# Patient Record
Sex: MALE | Race: WHITE | NOT HISPANIC OR LATINO | Employment: FULL TIME | URBAN - METROPOLITAN AREA
[De-identification: names, ages, dates, MRNs, and addresses within clinical notes are randomized per-mention and may not be internally consistent; named-entity substitution may affect disease eponyms.]

---

## 2018-01-15 ENCOUNTER — OFFICE VISIT (OUTPATIENT)
Dept: FAMILY MEDICINE | Facility: CLINIC | Age: 58
End: 2018-01-15
Payer: COMMERCIAL

## 2018-01-15 VITALS
HEIGHT: 70 IN | DIASTOLIC BLOOD PRESSURE: 81 MMHG | WEIGHT: 171 LBS | BODY MASS INDEX: 24.48 KG/M2 | HEART RATE: 80 BPM | SYSTOLIC BLOOD PRESSURE: 140 MMHG

## 2018-01-15 DIAGNOSIS — Z13.29 SCREENING FOR THYROID DISORDER: ICD-10-CM

## 2018-01-15 DIAGNOSIS — R06.83 SNORING: ICD-10-CM

## 2018-01-15 DIAGNOSIS — R35.0 URINARY FREQUENCY: Primary | ICD-10-CM

## 2018-01-15 DIAGNOSIS — Z13.220 LIPID SCREENING: ICD-10-CM

## 2018-01-15 DIAGNOSIS — Z13.1 DIABETES MELLITUS SCREENING: ICD-10-CM

## 2018-01-15 DIAGNOSIS — R03.0 ELEVATED BLOOD PRESSURE READING: ICD-10-CM

## 2018-01-15 DIAGNOSIS — N52.9 PRIMARY ERECTILE DYSFUNCTION: ICD-10-CM

## 2018-01-15 PROCEDURE — 99203 OFFICE O/P NEW LOW 30 MIN: CPT | Mod: ,,, | Performed by: INTERNAL MEDICINE

## 2018-01-15 RX ORDER — TADALAFIL 10 MG/1
10 TABLET ORAL DAILY PRN
Qty: 8 TABLET | Refills: 5 | Status: SHIPPED | OUTPATIENT
Start: 2018-01-15 | End: 2019-01-15

## 2018-01-15 NOTE — PROGRESS NOTES
Subjective:       Patient ID: Gaudencio Garsia is a 57 y.o. male.    Chief Complaint: Urinary Frequency; Establish Care; Erectile Dysfunction; and Snoring    Mr. Gaudencio Garsia is a 57-year-old  male who comes for new patient evaluation. Thus far he has not been diagnosed with any major medical issues like hypertension, dyslipidemia, sugar diabetes,COPD, arthritis or asthma.    He did experience some symptoms of urgency and frequency recently when he had a flu symptom and perhaps had taken some over-the-counter decongestant medications. After being relieved from FLU symptoms, his symptoms of urinary frequency and urgency are getting better.    He did have a colonoscopy approximately 9 years ago and he was told that he should come back in 10 years.    Father did have a bypass surgery and had a heart monitor. Mother is hypertensive and diabetic. Patient's sister is doing okay.    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          Urinary Frequency    This is a new problem. The current episode started in the past 7 days. The problem occurs intermittently. The problem has been rapidly improving. He is sexually active. There is no history of pyelonephritis. Associated symptoms include frequency (better now). Pertinent negatives include no chills, hematuria, hesitancy, weight loss, bubble bath use, constipation, rash or withholding. He has tried nothing for the symptoms. There is no history of catheterization, diabetes insipidus, diabetes mellitus, hypertension, kidney stones, recurrent UTIs, STD, urinary stasis or a urological procedure.   Erectile Dysfunction   This is a chronic problem. The current episode started more than 1 year ago. The problem has been waxing and waning since onset. The nature of his difficulty is maintaining erection. Non-physiologic factors contributing to erectile dysfunction are performance anxiety. He reports no anxiety or decreased libido. Irritative symptoms  include frequency (better now). Obstructive symptoms do not include an intermittent stream or a slower stream. Pertinent negatives include no chills, dysuria, hematuria or hesitancy. The symptoms are aggravated by stress (Wife on some meds which reduce her desire). Past treatments include nothing. There are no known risk factors.       Past Medical History:   Diagnosis Date    Allergy     Bee Pollens     Social History     Social History    Marital status:      Spouse name: N/A    Number of children: N/A    Years of education: N/A     Occupational History    Dispatches  [employer]     Social History Main Topics    Smoking status: Never Smoker    Smokeless tobacco: Never Used    Alcohol use 1.8 - 2.4 oz/week     3 - 4 Cans of beer per week      Comment: 6 over weekend occ hard liquor    Drug use: No    Sexual activity: Yes     Partners: Female     Other Topics Concern    Not on file     Social History Narrative    No narrative on file     Past Surgical History:   Procedure Laterality Date    COLONOSCOPY  2009    Dr julien    TONSILLECTOMY       Family History   Problem Relation Age of Onset    Diabetes Mother     Hearing loss Mother     Cancer Father     Hearing loss Father        Review of Systems   Constitutional: Negative for activity change, appetite change, chills, fatigue, unexpected weight change and weight loss.   HENT: Negative for congestion, sneezing and trouble swallowing.    Eyes: Negative for pain and visual disturbance.   Respiratory: Negative for cough, chest tightness and shortness of breath.    Cardiovascular: Negative for chest pain, palpitations and leg swelling.   Gastrointestinal: Negative for abdominal distention, abdominal pain, blood in stool, constipation and diarrhea.   Endocrine: Negative for cold intolerance, heat intolerance, polydipsia, polyphagia and polyuria.   Genitourinary: Positive for frequency (better now). Negative for decreased libido, dysuria, hematuria,  "hesitancy and scrotal swelling.   Musculoskeletal: Negative for arthralgias, back pain and gait problem.   Skin: Negative for pallor, rash and wound.   Allergic/Immunologic: Negative for environmental allergies, food allergies and immunocompromised state.   Neurological: Negative for dizziness, seizures, speech difficulty, light-headedness and numbness.   Hematological: Negative for adenopathy. Does not bruise/bleed easily.   Psychiatric/Behavioral: Negative for agitation, behavioral problems and confusion. The patient is not nervous/anxious.        Objective:       Vitals:    01/15/18 1458   BP: (!) 140/81   Pulse: 80   Weight: 77.6 kg (171 lb)   Height: 5' 10" (1.778 m)     Physical Exam   Constitutional: He is oriented to person, place, and time. He appears well-developed.   HENT:   Head: Normocephalic and atraumatic.   Nose: Nose normal.   Mouth/Throat: Oropharynx is clear and moist. No oropharyngeal exudate.   Eyes: Conjunctivae and EOM are normal.   Neck: Normal range of motion. Neck supple. No JVD present. No tracheal deviation present. No thyromegaly present.   Cardiovascular: Normal rate, regular rhythm and normal heart sounds.  Exam reveals no gallop and no friction rub.    No murmur heard.  Pulmonary/Chest: Effort normal and breath sounds normal. No respiratory distress. He has no wheezes. He has no rales.   Abdominal: Soft. Bowel sounds are normal. He exhibits no distension. There is no tenderness.   Musculoskeletal: Normal range of motion.   Neurological: He is alert and oriented to person, place, and time. He has normal reflexes.   Skin: Skin is warm and dry.   Psychiatric: He has a normal mood and affect.   Nursing note and vitals reviewed.      Assessment:       1. Urinary frequency    2. Primary erectile dysfunction    3. Snoring    4. Elevated blood pressure reading    5. Lipid screening    6. Diabetes mellitus screening    7. Screening for thyroid disorder         Plan:           Urinary " frequency    Primary erectile dysfunction  -     tadalafil (CIALIS) 10 MG tablet; Take 1 tablet (10 mg total) by mouth daily as needed for Erectile Dysfunction.  Dispense: 8 tablet; Refill: 5    Snoring    Elevated blood pressure reading    Lipid screening  -     Lipid panel; Future; Expected date: 01/15/2018    Diabetes mellitus screening  -     Glucose, fasting; Future; Expected date: 01/15/2018    Screening for thyroid disorder  -     TSH; Future; Expected date: 01/15/2018    Had a fair discussion about elevated BP. ED issues- pros and cons of treatment, expectations and cost of meds,( Cialis Vs Viagra)    Will screen for DM, Lipids and thyroid before any potential TASHI testing.    Fup in 3 months to review status and benefit from meds. Consider  PSA next time and Hep C Screening,

## 2018-01-15 NOTE — PATIENT INSTRUCTIONS
Evaluating Erectile Dysfunction     Doctor talking to man.     Many men feel embarrassed to talk to a doctor about erectile dysfunction (ED). This common problem can be treated, but only if your doctor knows about it. Your doctor will likely ask you questions about your ED. Whether youre asked or not, tell your doctor anything that might help your doctor understand the problem. Your doctor may do an exam and may run some tests to help find the cause of your ED.  A simple exam  A medical exam may help your doctor understand what is causing your problem. ED is sometimes the first sign of some other health problem, so your doctor may check your overall health. He or she may also examine your penis, scrotum, and testicles. Tell your doctor about all of the medicines you take, including prescribed and over-the-counter medicines, as well as any herbs or supplements.  You may have some tests  Your doctor may recommend some or all of these tests:  · Blood tests measure your levels of hormones or lipids (fatty substances in the blood, including cholesterol). Other tests check for diabetes or help show the health of your liver, kidneys, and prostate.  · Blood flow tests check how well blood moves through your penis.  · A rectal exam checks for an enlarged prostate gland. An enlarged prostate and ED have been linked in recent studies.  · Additional tests check for other conditions that limit your ability to have intercourse.  Your treatment plan  Based on what you say and what any exam shows, your doctor will recommend a treatment plan. The first step may be to try ED medicines, since they help most men. If they dont help you, your doctor can suggest other kinds of treatment. You and your partner may also want to discuss which options would work best in your relationship. Treatment may include addressing the cause of health problems, such as lowering your cholesterol. And counseling may be recommended to talk about  underlying emotional issues.  Date Last Reviewed: 1/1/2017  © 7058-9808 [employer], [employer]. [address] [place] [address], [place] [address]. All rights reserved. This information is not intended as a substitute for professional medical care. Always follow your healthcare professional's instructions.

## 2024-11-26 ENCOUNTER — OFFICE VISIT (OUTPATIENT)
Dept: URGENT CARE | Facility: CLINIC | Age: 64
End: 2024-11-26
Payer: MEDICARE

## 2024-11-26 VITALS
SYSTOLIC BLOOD PRESSURE: 144 MMHG | DIASTOLIC BLOOD PRESSURE: 88 MMHG | WEIGHT: 180 LBS | OXYGEN SATURATION: 96 % | HEART RATE: 81 BPM | RESPIRATION RATE: 19 BRPM | BODY MASS INDEX: 25.77 KG/M2 | HEIGHT: 70 IN | TEMPERATURE: 98 F

## 2024-11-26 DIAGNOSIS — J02.9 SORE THROAT: ICD-10-CM

## 2024-11-26 DIAGNOSIS — J02.0 STREP PHARYNGITIS: Primary | ICD-10-CM

## 2024-11-26 LAB
CTP QC/QA: YES
S PYO RRNA THROAT QL PROBE: POSITIVE

## 2024-11-26 RX ORDER — AMOXICILLIN 500 MG/1
500 TABLET, FILM COATED ORAL EVERY 12 HOURS
Qty: 20 TABLET | Refills: 0 | Status: SHIPPED | OUTPATIENT
Start: 2024-11-26 | End: 2024-12-06

## 2024-11-26 NOTE — PROGRESS NOTES
"Subjective:      Patient ID: Gaudencio Garsia is a 64 y.o. male.    Vitals:  height is 5' 10" (1.778 m) and weight is 81.6 kg (180 lb). His temperature is 98.1 °F (36.7 °C). His blood pressure is 144/88 (abnormal) and his pulse is 81. His respiration is 19 and oxygen saturation is 96%.     Chief Complaint: Sore Throat    Sore Throat   This is a new problem. The current episode started in the past 7 days (3 days). The problem has been unchanged. There has been no fever. Associated symptoms include trouble swallowing. Pertinent negatives include no abdominal pain, congestion, coughing, diarrhea, ear discharge, ear pain, headaches, neck pain, shortness of breath or vomiting.       Constitution: Negative for activity change, appetite change, chills, fatigue, fever, unexpected weight change and generalized weakness.   HENT:  Positive for sore throat and trouble swallowing. Negative for ear pain, ear discharge, foreign body in ear, tinnitus, hearing loss, dental problem, mouth sores, tongue pain, facial swelling, congestion, postnasal drip, sinus pain, sinus pressure and voice change.    Neck: Negative for neck pain, neck stiffness and painful lymph nodes.   Cardiovascular:  Negative for chest pain, leg swelling, palpitations and sob on exertion.   Eyes:  Negative for eye trauma, eye discharge, eye itching, eye pain, eye redness, vision loss and eyelid swelling.   Respiratory:  Negative for chest tightness, cough, sputum production, COPD, shortness of breath, wheezing and asthma.    Gastrointestinal:  Negative for abdominal pain, nausea, vomiting, constipation, diarrhea, bright red blood in stool and dark colored stools.   Endocrine: hair loss, cold intolerance and heat intolerance.   Genitourinary:  Negative for dysuria, frequency, urgency, flank pain and hematuria.   Musculoskeletal:  Negative for pain, trauma, joint pain, joint swelling, abnormal ROM of joint, back pain and muscle cramps.   Skin:  Negative for color " change, pale, rash, wound and hives.   Allergic/Immunologic: Negative for environmental allergies, seasonal allergies, food allergies, asthma, hives and itching.   Neurological:  Negative for dizziness, history of vertigo, light-headedness, facial drooping, speech difficulty, headaches, disorientation, altered mental status, loss of consciousness and numbness.   Hematologic/Lymphatic: Negative for swollen lymph nodes and easy bruising/bleeding. Does not bruise/bleed easily.   Psychiatric/Behavioral:  Negative for altered mental status, disorientation, confusion, agitation, sleep disturbance and hallucinations.       Objective:     Physical Exam   Constitutional: He is oriented to person, place, and time. He appears well-developed. He is cooperative.   HENT:   Head: Normocephalic and atraumatic.   Ears:   Right Ear: Hearing, tympanic membrane, external ear and ear canal normal.   Left Ear: Hearing, tympanic membrane, external ear and ear canal normal.   Nose: Nose normal. No mucosal edema or nasal deformity. No epistaxis. Right sinus exhibits no maxillary sinus tenderness and no frontal sinus tenderness. Left sinus exhibits no maxillary sinus tenderness and no frontal sinus tenderness.   Mouth/Throat: Uvula is midline and mucous membranes are normal. No trismus in the jaw. Normal dentition. No uvula swelling. Oropharyngeal exudate, posterior oropharyngeal edema and posterior oropharyngeal erythema present.   Eyes: Conjunctivae and lids are normal.   Neck: Trachea normal and phonation normal. Neck supple.   Cardiovascular: Normal rate, regular rhythm, normal heart sounds and normal pulses.   Pulmonary/Chest: Effort normal and breath sounds normal.   Abdominal: Normal appearance and bowel sounds are normal. Soft.   Musculoskeletal: Normal range of motion.         General: Normal range of motion.   Neurological: He is alert and oriented to person, place, and time. He exhibits normal muscle tone.   Skin: Skin is warm,  dry and intact.   Psychiatric: His speech is normal and behavior is normal. Judgment and thought content normal.   Nursing note and vitals reviewed.      Assessment:     1. Strep pharyngitis    2. Sore throat        Plan:       Strep pharyngitis  -     amoxicillin (AMOXIL) 500 MG Tab; Take 1 tablet (500 mg total) by mouth every 12 (twelve) hours. for 10 days  Dispense: 20 tablet; Refill: 0    Sore throat  -     POCT rapid strep A      Utilize over-the-counter Tylenol or Motrin as directed for fever.    Ensure adequate fluid intake with electrolytes.    Return to clinic for new or worsening symptoms.  Patient is recommended to follow-up with their PCP post discharge.    Total time spent on med rec, H&P, with over half of the time in direct patient care: 34 minutes      DISCLAIMER: Please note that my documentation in this Electronic Healthcare Record was produced using speech recognition software and therefore may contain errors related to that software system.These could include grammar, punctuation and spelling errors or the inclusion/exclusion of phrases that were not intended. Garbled syntax, mangled pronouns, and other bizarre constructions may be attributed to that software system.        Additional MDM:     Heart Failure Score:   COPD = No

## 2025-06-11 ENCOUNTER — OFFICE VISIT (OUTPATIENT)
Dept: ORTHOPEDICS | Facility: CLINIC | Age: 65
End: 2025-06-11
Payer: MEDICARE

## 2025-06-11 ENCOUNTER — HOSPITAL ENCOUNTER (OUTPATIENT)
Dept: RADIOLOGY | Facility: HOSPITAL | Age: 65
Discharge: HOME | End: 2025-06-11
Attending: ORTHOPAEDIC SURGERY
Payer: MEDICARE

## 2025-06-11 VITALS — WEIGHT: 179.88 LBS | BODY MASS INDEX: 25.75 KG/M2 | HEIGHT: 70 IN

## 2025-06-11 DIAGNOSIS — M47.816 FACET ARTHRITIS OF LUMBAR REGION: ICD-10-CM

## 2025-06-11 DIAGNOSIS — M54.16 LUMBAR RADICULOPATHY: Primary | ICD-10-CM

## 2025-06-11 DIAGNOSIS — M51.361 DEGENERATION OF INTERVERTEBRAL DISC OF LUMBAR REGION WITH LOWER EXTREMITY PAIN: ICD-10-CM

## 2025-06-11 PROCEDURE — 72170 X-RAY EXAM OF PELVIS: CPT | Mod: 26,,, | Performed by: RADIOLOGY

## 2025-06-11 PROCEDURE — 72100 X-RAY EXAM L-S SPINE 2/3 VWS: CPT | Mod: 26,,, | Performed by: RADIOLOGY

## 2025-06-11 PROCEDURE — 72100 X-RAY EXAM L-S SPINE 2/3 VWS: CPT | Mod: TC,PN

## 2025-06-11 PROCEDURE — 99999 PR PBB SHADOW E&M-EST. PATIENT-LVL III: CPT | Mod: PBBFAC,,, | Performed by: ORTHOPAEDIC SURGERY

## 2025-06-11 PROCEDURE — 72170 X-RAY EXAM OF PELVIS: CPT | Mod: TC,PN

## 2025-06-11 PROCEDURE — 99203 OFFICE O/P NEW LOW 30 MIN: CPT | Mod: S$PBB,,, | Performed by: ORTHOPAEDIC SURGERY

## 2025-06-11 PROCEDURE — 99213 OFFICE O/P EST LOW 20 MIN: CPT | Mod: PBBFAC,25,PN | Performed by: ORTHOPAEDIC SURGERY

## 2025-06-11 RX ORDER — HYDROCODONE BITARTRATE AND ACETAMINOPHEN 7.5; 325 MG/1; MG/1
1 TABLET ORAL EVERY 6 HOURS PRN
COMMUNITY
Start: 2025-05-29 | End: 2025-06-11 | Stop reason: SDUPTHER

## 2025-06-11 RX ORDER — LATANOPROST 50 UG/ML
1 SOLUTION/ DROPS OPHTHALMIC NIGHTLY
COMMUNITY
Start: 2025-05-21

## 2025-06-11 RX ORDER — HYDROCODONE BITARTRATE AND ACETAMINOPHEN 7.5; 325 MG/1; MG/1
1 TABLET ORAL EVERY 6 HOURS PRN
Qty: 28 TABLET | Refills: 0 | Status: SHIPPED | OUTPATIENT
Start: 2025-06-11 | End: 2025-06-18

## 2025-06-11 RX ORDER — METHYLPREDNISOLONE 4 MG/1
TABLET ORAL
Qty: 21 TABLET | Refills: 0 | Status: SHIPPED | OUTPATIENT
Start: 2025-06-11

## 2025-06-11 NOTE — PROGRESS NOTES
Subjective:       Patient ID: Gaudencio Garsia is a 64 y.o. male.    Chief Complaint: Pain of the Lumbar Spine (Lumbar/R side sciatic pain x 2-3 weeks after lifting a window AC unit. Was given Hydrocodone and cortisol inj by PCP. Limited standing, walking, bending. Weakness in legs, pain better with activity. Pain is sharp and burning, moderate in nature. Pain radiates down R leg to just below the knee. Has R foot numbness. Pain is intermittent and daily. )      History of Present Illness    Prior to meeting with the patient I reviewed the medical chart in [facility]. This included reviewing the previous progress notes from our office, review of the patient's last appointment with their primary care provider, review of any visits to the emergency room, and review of any pain management appointments or procedures.   Two week history of acute onset of pain and weakness in the right lower extremity after lifting an air conditioning unit.  No bowel or bladder incontinence.  Saw his primary care doctor who ordered hydrocodone ibuprofen and Flexeril.  Minimal back pain.    Current Medications  Current Medications[1]    Allergies  Review of patient's allergies indicates:   Allergen Reactions    Bee pollens        Past Medical History  Past Medical History:   Diagnosis Date    Allergy     Bee Pollens    Basal cell carcinoma        Surgical History  Past Surgical History:   Procedure Laterality Date    COLONOSCOPY  2009    Dr julien    TONSILLECTOMY         Family History:   Family History   Problem Relation Name Age of Onset    Diabetes Mother      Hearing loss Mother      Cancer Father      Hearing loss Father      Melanoma Father         Social History:   Social History[2]    Hospitalization/Major Diagnostic Procedure:     Review of Systems     General/Constitutional:  Chills denies. Fatigue denies. Fever denies. Weight gain denies. Weight loss denies.    Respiratory:  Shortness of breath denies.    Cardiovascular:  Chest  pain denies.    Gastrointestinal:  Constipation denies. Diarrhea denies. Nausea denies. Vomiting denies.     Hematology:  Easy bruising denies. Prolonged bleeding denies.     Genitourinary:  Frequent urination denies. Pain in lower back denies. Painful urination denies.     Musculoskeletal:  See HPI for details    Skin:  Rash denies.    Neurologic:  Dizziness denies. Gait abnormalities denies. Seizures denies. Tingling/Numbess denies.    Psychiatric:  Anxiety denies. Depressed mood denies.     Objective:   Vital Signs: There were no vitals filed for this visit.     Physical Exam      General Examination:     Constitutional: The patient is alert and oriented to lace person and time. Mood is pleasant.     Head/Face: Normal facial features normal eyebrows    Eyes: Normal extraocular motion bilaterally    Lungs: Respirations are equal and unlabored    Gait is coordinated.    Cardiovascular: There are no swelling or varicosities present.    Lymphatic: Negative for adenopathy    Skin: Normal    Neurological: Level of consciousness normal. Oriented to place person and time and situation    Psychiatric: Oriented to time place person and situation    Lumbar spine minimal tenderness no spasm mild restriction of motion straight leg raising weakly positive on the right side motor exam grade 4 5 EHL as well as ankle dorsiflexors.  Reflexes patellar and Achilles symmetrical hip and knee range of motion intact no edema adenopathy varicosities    XRAY Report/ Interpretation :  AP pelvis x-ray was taken today. Indications low back pain and/or hip pain. Findings AP pelvis x-ray appears to be normal with no evidence of fractures or significant degenerative disease    AP and lateral x-rays of lumbar spine will performed today. Indications low back pain. Findings:  Moderate narrowing L4-5 disc space altered early multilevel anterior osteophytes adjacent to disc spaces indicative longstanding spondylosis      Assessment:       1. Lumbar  radiculopathy    2. Degeneration of intervertebral disc of lumbar region with lower extremity pain    3. Facet arthritis of lumbar region        Plan:       Gaudencio was seen today for pain.    Diagnoses and all orders for this visit:    Lumbar radiculopathy  -     X-Ray Lumbar Spine Ap And Lateral  -     X-Ray Pelvis Routine AP  -     HYDROcodone-acetaminophen (NORCO) 7.5-325 mg per tablet; Take 1 tablet by mouth every 6 (six) hours as needed for Pain.    Degeneration of intervertebral disc of lumbar region with lower extremity pain  -     X-Ray Lumbar Spine Ap And Lateral  -     X-Ray Pelvis Routine AP  -     HYDROcodone-acetaminophen (NORCO) 7.5-325 mg per tablet; Take 1 tablet by mouth every 6 (six) hours as needed for Pain.    Facet arthritis of lumbar region  -     X-Ray Lumbar Spine Ap And Lateral  -     X-Ray Pelvis Routine AP    Other orders  -     methylPREDNISolone (MEDROL DOSEPACK) 4 mg tablet; use as directed         Follow up for 3-4 week f/u - lumbar radiculopathy.    Patient has acute radiculopathy right lower extremity probably from the L4-5 level treatment options discussed and he prefers to be observed.  Medrol Dosepak and hydrocodone have been ordered.  We have warned him about potential risk and side effects if he develops any symptoms of cauda equina syndrome which were all carefully discussed in detail and understood by the patient    The risk associated with the spinal condition and an worsening of the condition was discussed with the patient expressed a thorough understanding.  The patient was warned about the possible development of cauda equina syndrome and its symptoms which include but are not limited to bowel or bladder dysfunction sexual dysfunction increasing pain increasing extremity numbness or weakness and saddle anesthesia.  The patient was advised to either contact me immediately or to go to the nearest hospital emergency room if symptoms of cauda equina syndrome with to develop.   The patient expressed an understanding of these instructions.    Return 3-4 weeks if still symptomatic or if weakness in the right foot does not improve proceed to MRI.  Patient agrees with treatment plan.  Treatment options were discussed with regards to the nature of the medical condition. Conservative pain intervention and surgical options were discussed in detail. The probability of success of each separate treatment option was discussed. The patient expressed a clear understanding of the treatment options. With regards to surgery, the procedure risk, benefits, complications, and outcomes were discussed. No guarantees were given with regards to surgical outcome.   The risk of complications, morbidity, and mortality of patient management decisions have been made at the time of this visit. These are associated with the patient's problems, diagnostic procedures and treatment options. This includes the possible management options selected and those considered but not selected by the patient after shared medical decision making we discussed with the patient.     This note was created using Dragon voice recognition software that occasionally misinterpreted phrases or words.           [1]   Current Outpatient Medications   Medication Sig Dispense Refill    latanoprost 0.005 % ophthalmic solution Place 1 drop into both eyes every evening.      tadalafil (CIALIS) 10 MG tablet Take 1 tablet (10 mg total) by mouth daily as needed for Erectile Dysfunction. 8 tablet 5    amoxicillin (AMOXIL) 500 MG capsule TAKE 1 CAPSULE BY MOUTH THREE TIMES DAILY AS DIRECTED (Patient not taking: Reported on 11/26/2024)      cyclobenzaprine (FLEXERIL) 10 MG tablet Take 10 mg by mouth every 8 (eight) hours as needed. FOR MUSCLE SPASM (Patient not taking: Reported on 11/26/2024)      HYDROcodone-acetaminophen (NORCO) 7.5-325 mg per tablet Take 1 tablet by mouth every 6 (six) hours as needed for Pain. 28 tablet 0    ibuprofen (ADVIL,MOTRIN) 600  MG tablet Take 600 mg by mouth every 6 (six) hours as needed. (Patient not taking: Reported on 11/26/2024)      methylPREDNISolone (MEDROL DOSEPACK) 4 mg tablet use as directed 21 tablet 0     No current facility-administered medications for this visit.   [2]   Social History  Socioeconomic History    Marital status:    Occupational History    Occupation: Dispatches      Employer: [employer]   Tobacco Use    Smoking status: Never    Smokeless tobacco: Never   Substance and Sexual Activity    Alcohol use: Yes     Alcohol/week: 3.0 - 4.0 standard drinks of alcohol     Types: 3 - 4 Cans of beer per week     Comment: 6 over weekend occ hard liquor    Drug use: No    Sexual activity: Yes     Partners: Female

## 2025-06-30 ENCOUNTER — TELEPHONE (OUTPATIENT)
Dept: ORTHOPEDICS | Facility: CLINIC | Age: 65
End: 2025-06-30
Payer: MEDICARE

## 2025-06-30 DIAGNOSIS — M47.816 FACET ARTHRITIS OF LUMBAR REGION: ICD-10-CM

## 2025-06-30 DIAGNOSIS — M54.16 LUMBAR RADICULOPATHY: Primary | ICD-10-CM

## 2025-06-30 DIAGNOSIS — M51.361 DEGENERATION OF INTERVERTEBRAL DISC OF LUMBAR REGION WITH LOWER EXTREMITY PAIN: ICD-10-CM

## 2025-06-30 NOTE — TELEPHONE ENCOUNTER
----- Message from Wolfgang Zheng sent at 6/30/2025  9:51 AM CDT -----  Regarding: MRI  Patient is requesting the MRI be ordered as his pain has not improved, he has an appointment to return already

## 2025-07-09 ENCOUNTER — TELEPHONE (OUTPATIENT)
Dept: ORTHOPEDICS | Facility: CLINIC | Age: 65
End: 2025-07-09
Payer: MEDICARE

## 2025-07-09 ENCOUNTER — HOSPITAL ENCOUNTER (OUTPATIENT)
Dept: RADIOLOGY | Facility: HOSPITAL | Age: 65
Discharge: HOME | End: 2025-07-09
Attending: ORTHOPAEDIC SURGERY
Payer: MEDICARE

## 2025-07-09 DIAGNOSIS — M47.816 FACET ARTHRITIS OF LUMBAR REGION: ICD-10-CM

## 2025-07-09 DIAGNOSIS — M51.361 DEGENERATION OF INTERVERTEBRAL DISC OF LUMBAR REGION WITH LOWER EXTREMITY PAIN: ICD-10-CM

## 2025-07-09 DIAGNOSIS — N28.89 RENAL MASS, RIGHT: Primary | ICD-10-CM

## 2025-07-09 DIAGNOSIS — M54.16 LUMBAR RADICULOPATHY: ICD-10-CM

## 2025-07-09 PROCEDURE — 72148 MRI LUMBAR SPINE W/O DYE: CPT | Mod: 26,,, | Performed by: RADIOLOGY

## 2025-07-09 PROCEDURE — 72148 MRI LUMBAR SPINE W/O DYE: CPT | Mod: TC,PO

## 2025-07-09 NOTE — TELEPHONE ENCOUNTER
Dr Severino notified us of abnormality of renal cyst seen on lumber MRI. Recommending CT of abdomen/pelvis with & without contrast for further evaluation.    Spoke to patient. Advised patient of what Dr Severino noted and that we put order in for CT and he should get a call to schedule.     Patient has f/u appt with us on Monday 7/14/25

## 2025-07-10 ENCOUNTER — HOSPITAL ENCOUNTER (OUTPATIENT)
Dept: RADIOLOGY | Facility: HOSPITAL | Age: 65
Discharge: HOME | End: 2025-07-10
Attending: ORTHOPAEDIC SURGERY
Payer: MEDICARE

## 2025-07-10 DIAGNOSIS — N28.89 RENAL MASS, RIGHT: ICD-10-CM

## 2025-07-10 PROCEDURE — 74178 CT ABD&PLV WO CNTR FLWD CNTR: CPT | Mod: TC

## 2025-07-10 PROCEDURE — 25500020 PHARM REV CODE 255

## 2025-07-10 PROCEDURE — 74178 CT ABD&PLV WO CNTR FLWD CNTR: CPT | Mod: 26,,, | Performed by: RADIOLOGY

## 2025-07-10 RX ADMIN — IOHEXOL 75 ML: 350 INJECTION, SOLUTION INTRAVENOUS at 08:07

## 2025-07-14 ENCOUNTER — OFFICE VISIT (OUTPATIENT)
Dept: ORTHOPEDICS | Facility: CLINIC | Age: 65
End: 2025-07-14
Payer: MEDICARE

## 2025-07-14 VITALS — BODY MASS INDEX: 25.75 KG/M2 | WEIGHT: 179.88 LBS | HEIGHT: 70 IN

## 2025-07-14 DIAGNOSIS — M51.26 LUMBAR DISC HERNIATION: Primary | ICD-10-CM

## 2025-07-14 DIAGNOSIS — M51.361 DEGENERATION OF INTERVERTEBRAL DISC OF LUMBAR REGION WITH LOWER EXTREMITY PAIN: ICD-10-CM

## 2025-07-14 DIAGNOSIS — M54.16 LUMBAR RADICULOPATHY: ICD-10-CM

## 2025-07-14 DIAGNOSIS — N28.89 RENAL MASS, RIGHT: ICD-10-CM

## 2025-07-14 PROCEDURE — 99213 OFFICE O/P EST LOW 20 MIN: CPT | Mod: S$PBB,,, | Performed by: ORTHOPAEDIC SURGERY

## 2025-07-14 PROCEDURE — 99999 PR PBB SHADOW E&M-EST. PATIENT-LVL III: CPT | Mod: PBBFAC,,, | Performed by: ORTHOPAEDIC SURGERY

## 2025-07-14 PROCEDURE — 99213 OFFICE O/P EST LOW 20 MIN: CPT | Mod: PBBFAC,PN | Performed by: ORTHOPAEDIC SURGERY

## 2025-07-14 NOTE — PROGRESS NOTES
Subjective:       Patient ID: Gaudencio Garsia is a 64 y.o. male.    Chief Complaint: Pain of the Lumbar Spine (Follow up - lumbar radiculopathy after Medrol dosepack and hydrocodone, review MRI results. States he did not feel a difference with the Medrol dosepack but good relief with pain medication. Still having R calf tightness and some numbness in R toes. )      History of Present Illness    Prior to meeting with the patient I reviewed the medical chart in [facility]. This included reviewing the previous progress notes from our office, review of the patient's last appointment with their primary care provider, review of any visits to the emergency room, and review of any pain management appointments or procedures.   Patient with right L4 radiculopathy 6 weeks' duration returns for follow-up of his MRI.  There was an abnormality seen on the right kidney at time of the MRI and CT scan was ordered as suggested by the radiologist's he was started on a Medrol Dosepak than his radicular symptoms are much better.  Incidentally he did have weakness in a partial footdrop on the right side that has improved    Current Medications  Current Medications[1]    Allergies  Review of patient's allergies indicates:   Allergen Reactions    Bee pollens        Past Medical History  Past Medical History:   Diagnosis Date    Allergy     Bee Pollens    Basal cell carcinoma        Surgical History  Past Surgical History:   Procedure Laterality Date    COLONOSCOPY  2009    Dr julien    TONSILLECTOMY         Family History:   Family History   Problem Relation Name Age of Onset    Diabetes Mother      Hearing loss Mother      Cancer Father      Hearing loss Father      Melanoma Father         Social History:   Social History[2]    Hospitalization/Major Diagnostic Procedure:     Review of Systems     General/Constitutional:  Chills denies. Fatigue denies. Fever denies. Weight gain denies. Weight loss denies.    Respiratory:  Shortness of breath  denies.    Cardiovascular:  Chest pain denies.    Gastrointestinal:  Constipation denies. Diarrhea denies. Nausea denies. Vomiting denies.     Hematology:  Easy bruising denies. Prolonged bleeding denies.     Genitourinary:  Frequent urination denies. Pain in lower back denies. Painful urination denies.     Musculoskeletal:  See HPI for details    Skin:  Rash denies.    Neurologic:  Dizziness denies. Gait abnormalities denies. Seizures denies. Tingling/Numbess denies.    Psychiatric:  Anxiety denies. Depressed mood denies.     Objective:   Vital Signs: There were no vitals filed for this visit.     Physical Exam      General Examination:     Constitutional: The patient is alert and oriented to lace person and time. Mood is pleasant.     Head/Face: Normal facial features normal eyebrows    Eyes: Normal extraocular motion bilaterally    Lungs: Respirations are equal and unlabored    Gait is coordinated.    Cardiovascular: There are no swelling or varicosities present.    Lymphatic: Negative for adenopathy    Skin: Normal    Neurological: Level of consciousness normal. Oriented to place person and time and situation    Psychiatric: Oriented to time place person and situation    Straight-leg-raising only weakly positive on the right side at this point  XRAY Report/ Interpretation:  Lumbar MRI personally reviewed extruded disc herniation right-sided L3-4 noted.  Also mass on the upper pole of the right kidney noted.    CT scan as advised by radiologist's was performed it does show a mass on the right kidney there is a questionable small cyst of the liver.  Please see report for details      Assessment:       1. Lumbar disc herniation    2. Lumbar radiculopathy    3. 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 Renal mass, right        Plan:       Gaudencio was seen today for pain.    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    I advised the patient of the mass on the kidney and he has an appointment to see Dr. Mahmood today who is a urologist    Regarding his lumbar spine I have given him the option of observation medications epidural steroid injections or surgery certainly he can wait a little longer cyst were in the window of time that this should be any long-term consequences of his weakness in the right lower extremity  .  He clearly understands the significance of the mass on the right kidney and also his condition with regards to lumbar spine and treatment options he will call back and let me know what he wants to be referred to pain management for an epidural steroid injection he has an appointment today to see a urologist to evaluate this right renal mass.  Return for 6 weeks.  Patient did let me know if any symptoms change or worsen regards to his back and right lower extremity      Treatment options were discussed with regards to the nature of the medical condition. Conservative pain intervention and surgical options were discussed in detail. The probability of success of each separate treatment option was discussed. The patient expressed a clear understanding of the treatment options. With regards to surgery, the procedure risk, benefits, complications, and outcomes were discussed. No guarantees were given with regards to surgical outcome.   The risk of complications, morbidity, and mortality of patient management decisions have been made at the time of this visit. These are associated with the patient's problems, diagnostic procedures and treatment options. This includes the possible management options selected and those considered but not selected by the patient after shared medical decision making we discussed with the patient.     This note was  created using Dragon voice recognition software that occasionally misinterpreted phrases or words.         [1]   Current Outpatient Medications   Medication Sig Dispense Refill    latanoprost 0.005 % ophthalmic solution Place 1 drop into both eyes every evening.      tadalafil (CIALIS) 10 MG tablet Take 1 tablet (10 mg total) by mouth daily as needed for Erectile Dysfunction. 8 tablet 5    amoxicillin (AMOXIL) 500 MG capsule TAKE 1 CAPSULE BY MOUTH THREE TIMES DAILY AS DIRECTED (Patient not taking: Reported on 11/26/2024)      cyclobenzaprine (FLEXERIL) 10 MG tablet Take 10 mg by mouth every 8 (eight) hours as needed. FOR MUSCLE SPASM (Patient not taking: Reported on 11/26/2024)      ibuprofen (ADVIL,MOTRIN) 600 MG tablet Take 600 mg by mouth every 6 (six) hours as needed. (Patient not taking: Reported on 11/26/2024)      methylPREDNISolone (MEDROL DOSEPACK) 4 mg tablet use as directed 21 tablet 0     No current facility-administered medications for this visit.   [2]   Social History  Socioeconomic History    Marital status:    Occupational History    Occupation: Dispatches      Employer: [employer]   Tobacco Use    Smoking status: Never    Smokeless tobacco: Never   Substance and Sexual Activity    Alcohol use: Yes     Alcohol/week: 3.0 - 4.0 standard drinks of alcohol     Types: 3 - 4 Cans of beer per week     Comment: 6 over weekend occ hard liquor    Drug use: No    Sexual activity: Yes     Partners: Female     Social Drivers of Health     Financial Resource Strain: Low Risk  (7/14/2025)    Overall Financial Resource Strain (CARDIA)     Difficulty of Paying Living Expenses: Not hard at all   Food Insecurity: No Food Insecurity (7/14/2025)    Hunger Vital Sign     Worried About Running Out of Food in the Last Year: Never true     Ran Out of Food in the Last Year: Never true   Transportation Needs: No Transportation Needs (7/14/2025)    PRAPARE - Transportation     Lack of Transportation (Medical): No     Lack  of Transportation (Non-Medical): No   Physical Activity: Sufficiently Active (7/14/2025)    Exercise Vital Sign     Days of Exercise per Week: 2 days     Minutes of Exercise per Session: 120 min   Stress: No Stress Concern Present (7/14/2025)    [place] [employer] - Occupational Stress Questionnaire     Feeling of Stress : Only a little   Housing Stability: Low Risk  (7/14/2025)    Housing Stability Vital Sign     Unable to Pay for Housing in the Last Year: No     Number of Times Moved in the Last Year: 0     Homeless in the Last Year: No

## 2025-07-28 ENCOUNTER — HOSPITAL ENCOUNTER (OUTPATIENT)
Dept: RADIOLOGY | Facility: HOSPITAL | Age: 65
Discharge: HOME | End: 2025-07-28
Attending: STUDENT IN AN ORGANIZED HEALTH CARE EDUCATION/TRAINING PROGRAM
Payer: MEDICARE

## 2025-07-28 ENCOUNTER — OFFICE VISIT (OUTPATIENT)
Dept: UROLOGY | Facility: CLINIC | Age: 65
End: 2025-07-28
Payer: MEDICARE

## 2025-07-28 VITALS
HEART RATE: 83 BPM | SYSTOLIC BLOOD PRESSURE: 157 MMHG | WEIGHT: 181.44 LBS | HEIGHT: 70 IN | DIASTOLIC BLOOD PRESSURE: 95 MMHG | BODY MASS INDEX: 25.98 KG/M2

## 2025-07-28 DIAGNOSIS — N28.89 RENAL MASS: Primary | ICD-10-CM

## 2025-07-28 DIAGNOSIS — N28.89 RENAL MASS: ICD-10-CM

## 2025-07-28 PROCEDURE — 99205 OFFICE O/P NEW HI 60 MIN: CPT | Mod: S$PBB,,, | Performed by: STUDENT IN AN ORGANIZED HEALTH CARE EDUCATION/TRAINING PROGRAM

## 2025-07-28 PROCEDURE — 99999 PR PBB SHADOW E&M-EST. PATIENT-LVL IV: CPT | Mod: PBBFAC,,, | Performed by: STUDENT IN AN ORGANIZED HEALTH CARE EDUCATION/TRAINING PROGRAM

## 2025-07-28 PROCEDURE — 99214 OFFICE O/P EST MOD 30 MIN: CPT | Mod: PBBFAC | Performed by: STUDENT IN AN ORGANIZED HEALTH CARE EDUCATION/TRAINING PROGRAM

## 2025-07-28 PROCEDURE — 71250 CT THORAX DX C-: CPT | Mod: 26,,, | Performed by: RADIOLOGY

## 2025-07-28 PROCEDURE — 71250 CT THORAX DX C-: CPT | Mod: TC,PO

## 2025-07-28 PROCEDURE — G2211 COMPLEX E/M VISIT ADD ON: HCPCS | Mod: ,,, | Performed by: STUDENT IN AN ORGANIZED HEALTH CARE EDUCATION/TRAINING PROGRAM

## 2025-07-28 NOTE — PROGRESS NOTES
"[facility]  Urologic Oncology Clinic Note        Date of Service: 7/28/2025        Chief Complaint/Reason for Consultation: Right Renal Mass    Requesting Provider:   Self, Aaareferral  No address on file      History of Present Illness:   Patient 64 y.o. male presents with presents with incidental finding of right renal mass concerning for RCC. He obtained lumbar spine imaging due to back pain which found renal mass. He does have disc herniation and has been offered observation  vs, injections vs. Surgery.      Blood thinners: None   No Hx of TIA, MI, and CVA   Abdominal surgeries: None       Urologic History:     7/10/2025 -- CT AP w/ and w/o contrast --  Identified in the mid to upper pole of the right kidney is a 2.9 x 2.8 cm mildly heterogeneous solid mass demonstrating of contrast enhancement.   A 7 mm probable cyst is noted of the left lobe of the liver.          Prescriptions Prior to Admission[1]  Review of patient's allergies indicates:   Allergen Reactions    Bee pollens         Past Medical History:   Diagnosis Date    Allergy     Bee Pollens    Basal cell carcinoma       Past Surgical History:   Procedure Laterality Date    COLONOSCOPY  2009    Dr julien    TONSILLECTOMY        Family History   Problem Relation Name Age of Onset    Diabetes Mother      Hearing loss Mother      Cancer Father      Hearing loss Father      Melanoma Father        Social History     Tobacco Use    Smoking status: Never    Smokeless tobacco: Never   Substance Use Topics    Alcohol use: Yes     Alcohol/week: 3.0 - 4.0 standard drinks of alcohol     Types: 3 - 4 Cans of beer per week     Comment: 6 over weekend occ hard liquor        Review of Systems          OBJECTIVE:     Vitals:    07/28/25 0939   BP: (!) 157/95   BP Location: Left arm   Patient Position: Sitting   Pulse: 83   Weight: 82.3 kg (181 lb 7 oz)   Height: 5' 10" (1.778 m)          General Appearance: Alert, cooperative, no distress  Head: " Normocephalic  Eyes: Clear conjunctiva  Neck: No obvious LND or JVD  Lungs: Normal chest excursion, no accessory muscle use  Chest: Regular rate rhythm by palpation, no pedal edema  Abdomen: Soft, non-tender, non-distended  Male Genitalia: Deferred  Extremities: Atraumatic   Lymph Nodes: No appreciable lymph adenopathy  Neurologic: No gross gait, motor or sensory deficits            LAB:      All laboratory values listed below was/were independently reviewed with patient at this clinic visit.    CMP  Creatinine   Date Value Ref Range Status   07/10/2025 1.1 0.5 - 1.4 mg/dL Final     eGFR   Date Value Ref Range Status   07/10/2025 >60 >60 mL/min/1.73/m2 Final     Lab Results   Component Value Date    WBC 6.6 04/03/2020    HGB 14.8 04/03/2020    HCT 44.1 04/03/2020    MCV 93.8 04/03/2020     04/03/2020           IMAGING:      All imaging listed below was/were independently reviewed with patient at this clinic visit.      7/10/2025  CT ABDOMEN PELVIS W WO CONTRAST     CLINICAL HISTORY:  renal mass;Other specified disorders of kidney and ureter     TECHNIQUE:  Low dose axial images, sagittal and coronal reformations were obtained from the lung bases to the pubic symphysis before and following the IV administration of 75 mL of Omnipaque 350.     COMPARISON:  MR lumbar spine of July 9, 2025     FINDINGS:  The liver is of normal size contour and MR signal.  In the left lobe is a single 7 mm probable cyst.  Other focal liver masses are not seen.  The gallbladder is of normal size without CT evidence of stone.  The common bile duct is not dilated.  The pancreas is of normal contour and CT density without edema or mass.  The spleen is of normal size and CT density.     The kidneys are of normal size contour and contrast enhancement.  Identified in the mid to upper pole of the right kidney is a 2.9 x 2.8 cm mildly heterogeneous solid mass demonstrating of contrast enhancement.  This is suspicious for neoplasm.  At the  same level in the posterior surface of the right kidney is a 1.5 cm soft tissue density probable mass.  No mass of the left kidney is seen.  A stone or hydronephrosis is not seen on either side.  The renal vessels are of normal caliber.  Retroperitoneal adenopathy is not seen.     The stomach is of normal configuration.  Small bowel dilatation or air-fluid levels are not seen.  The colon is of normal caliber without distention or mass.  A normal appendix is seen.  Free fluid or free air in the abdomen is not seen.     The bladder is of normal contour without mass or asymmetry.  The prostate is not enlarged.     Impression:     Two soft tissue density masses of the right kidney measuring 2.9 cm and 1.5 cm are suspicious for neoplasm.  The largest corresponds to the mass identified on prior MR of the lumbar spine.  A 7 mm probable cyst is noted of the left lobe of the liver.     This report was flagged in Epic as abnormal.        Electronically signed by:Art Manrique MD  Date:                                            07/10/2025  Time:                                           08:43    ASSESSMENT/PLAN:     63 yo M w hx of right renal mass found incidentally for lumbar pain    Plan:    Right Renal Mass  The patient presents today for discussion and management of a right renal mass. Given the size of the mass and its appearance on imaging, we estimate its probability of being malignant to be 80%.     We discussed the role of additional studies to give further resolution as to the nature of this mass.  One approach would be renal mass biopsy.  This would be done with image guidance by our radiologist and would hopefully give direct pathologic assessment of the mass.  The risk of tumor seeding is thought to be low from this with modern techniques.  However there is an ~10 to 15% chance of a nondiagnostic biopsy, particularly in the setting of small renal masses.  There are also procedural risks associated with the  biopsy.  The decision to proceed with a biopsy would be relevant in the case where a patient is not comfortable with active surveillance without knowing the pathology of the mass. .     We discussed that in general there are three broad management options for small renal masses:         1) Active surveillance     Active surveillance implies no treatment for curative intent but the use of careful surveillance to monitor the lesion for growth.  The probability of metastasis developing while on surveillance of a small renal lesion is felt to be very low (1-3%), but is not impossible. The average growth rate for small renal tumors is 2-4 mm per year. We generally recommend imaging annually or every other year while on surveillance.  In some instances we consider obtaining a tumor biopsy prior to performing active surveillance, though this is not a universal practice.        2) Percutaneous ablation      Percutaneous ablation involves the placement of a needle within the lesion and the use of radiofrequency heating or cryoablation cooling to destroy the tumor.  Generally a biopsy of the tumor is obtained at the time of ablation to document histology.  At [facility] our preference is generally in favor of cryoablation because we can image the ice ball forming better and because efficacy seems better with larger tumors.  Retrospective studies showed that cryoablation is only slightly less efficacious than surgery, but has the advantage of less treatment related morbidity.  In most cases the procedure can be performed as an outpatient.  We explained that this is done by an interventional radiologist at our institution and that post-procedure imaging follow-up is organized by the urology service and continues for about 5 years to ensure the absence of a tumor recurrence.        3) Surgical removal      Surgery is another way to treat small renal masses.  In general, two forms of surgery are possible, radical nephrectomy and  partial nephrectomy.  For most small renal masses, we recommend partial nephrectomy because it allows preservation of renal function from the affected kidney.  However, in patients with kidneys that are poorly functional, in tumors that are invading critical structures, or in situations where the kidney tumor is located at an anatomical site within the kidney that makes renal reconstruction impossible, then radical nephrectomy may be required.  We discussed that surgery could be performed using an open technique or minimally invasive technique which could be either laparoscopic or robotic.  The choice of each technique depends on the patient's prior surgical history, body habitus, and location and size of the tumor. The patient is a GOOD candidate for a robotic partial nephrectomy. The pros and cons of these different techniques were discussed.  I explained that, occasionally, we may have to place a ureteral stent and maintain a urethral catheter to optimize urine drainage away from the site of reconstruction depending on the tumor location. The perioperative details of surgery and the postoperative recovery were discussed.  Potential risks of surgery were discussed, which include but are not limited to risks of anesthesia, bleeding, infection, adjacent organ injury, renal dysfunction, urine leak, medical complications of surgery, and death (although this is rare in modern partial nephrectomy).         The patient asked questions and all of them were answered.  After this discussion the patient consented to robotic partial nephrectomy.     Plan CT chest for staging  Plan retro partial 9/3/2025.           - Visit today included increased complexity associated with the ongoing care of the episodic problem renal mass which has been addressed and requires the longitudinal care of the patient due to the serious and/or complex managed problem of renal mass.       Asaf Felipe MD  Urologic Oncology  P:  [phone]             [1] (Not in a hospital admission)

## 2025-08-18 ENCOUNTER — OFFICE VISIT (OUTPATIENT)
Dept: UROLOGY | Facility: CLINIC | Age: 65
End: 2025-08-18
Payer: MEDICARE

## 2025-08-18 ENCOUNTER — RESEARCH ENCOUNTER (OUTPATIENT)
Dept: UROLOGY | Facility: CLINIC | Age: 65
End: 2025-08-18

## 2025-08-18 DIAGNOSIS — N28.89 RENAL MASS: Primary | ICD-10-CM

## 2025-08-18 PROCEDURE — 99999 PR PBB SHADOW E&M-EST. PATIENT-LVL I: CPT | Mod: PBBFAC,,,

## 2025-08-18 PROCEDURE — 87186 SC STD MICRODIL/AGAR DIL: CPT

## 2025-08-18 PROCEDURE — 99211 OFF/OP EST MAY X REQ PHY/QHP: CPT | Mod: PBBFAC

## 2025-08-21 LAB — BACTERIA UR CULT: ABNORMAL

## 2025-08-22 RX ORDER — LEVOFLOXACIN 500 MG/1
750 TABLET, FILM COATED ORAL DAILY
Qty: 18 TABLET | Refills: 0 | Status: SHIPPED | OUTPATIENT
Start: 2025-08-22 | End: 2025-09-03

## 2025-08-27 PROBLEM — N28.89 RENAL MASS: Status: ACTIVE | Noted: 2025-08-27

## 2025-08-28 ENCOUNTER — OFFICE VISIT (OUTPATIENT)
Facility: CLINIC | Age: 65
End: 2025-08-28
Payer: MEDICARE

## 2025-08-28 VITALS
BODY MASS INDEX: 26.34 KG/M2 | OXYGEN SATURATION: 99 % | HEIGHT: 70 IN | HEART RATE: 72 BPM | SYSTOLIC BLOOD PRESSURE: 151 MMHG | DIASTOLIC BLOOD PRESSURE: 84 MMHG | WEIGHT: 184 LBS | TEMPERATURE: 98 F

## 2025-08-28 DIAGNOSIS — Z01.818 PREOPERATIVE EXAMINATION: Primary | ICD-10-CM

## 2025-08-28 DIAGNOSIS — N28.89 RENAL MASS: ICD-10-CM

## 2025-08-28 PROCEDURE — 99999 PR PBB SHADOW E&M-EST. PATIENT-LVL III: CPT | Mod: PBBFAC,,, | Performed by: NURSE PRACTITIONER

## 2025-08-28 PROCEDURE — 99214 OFFICE O/P EST MOD 30 MIN: CPT | Mod: S$PBB,,, | Performed by: NURSE PRACTITIONER

## 2025-08-28 PROCEDURE — 99213 OFFICE O/P EST LOW 20 MIN: CPT | Mod: PBBFAC | Performed by: NURSE PRACTITIONER

## 2025-09-02 ENCOUNTER — TELEPHONE (OUTPATIENT)
Dept: UROLOGY | Facility: CLINIC | Age: 65
End: 2025-09-02
Payer: MEDICARE

## 2025-09-02 ENCOUNTER — ANESTHESIA EVENT (OUTPATIENT)
Dept: SURGERY | Facility: HOSPITAL | Age: 65
DRG: 661 | End: 2025-09-02
Payer: MEDICARE

## 2025-09-03 ENCOUNTER — ANESTHESIA (OUTPATIENT)
Dept: SURGERY | Facility: HOSPITAL | Age: 65
DRG: 661 | End: 2025-09-03
Payer: MEDICARE

## 2025-09-03 PROCEDURE — 63600175 PHARM REV CODE 636 W HCPCS: Performed by: STUDENT IN AN ORGANIZED HEALTH CARE EDUCATION/TRAINING PROGRAM

## 2025-09-03 PROCEDURE — 76942 ECHO GUIDE FOR BIOPSY: CPT

## 2025-09-03 PROCEDURE — 25000003 PHARM REV CODE 250

## 2025-09-03 PROCEDURE — 63600175 PHARM REV CODE 636 W HCPCS

## 2025-09-03 RX ORDER — ROCURONIUM BROMIDE 10 MG/ML
INJECTION, SOLUTION INTRAVENOUS
Status: DISCONTINUED | OUTPATIENT
Start: 2025-09-03 | End: 2025-09-03

## 2025-09-03 RX ORDER — CEFAZOLIN SODIUM 1 G/3ML
INJECTION, POWDER, FOR SOLUTION INTRAMUSCULAR; INTRAVENOUS
Status: DISCONTINUED | OUTPATIENT
Start: 2025-09-03 | End: 2025-09-03

## 2025-09-03 RX ORDER — DEXAMETHASONE SODIUM PHOSPHATE 4 MG/ML
INJECTION, SOLUTION INTRA-ARTICULAR; INTRALESIONAL; INTRAMUSCULAR; INTRAVENOUS; SOFT TISSUE
Status: DISCONTINUED | OUTPATIENT
Start: 2025-09-03 | End: 2025-09-03

## 2025-09-03 RX ORDER — PHENYLEPHRINE HYDROCHLORIDE 10 MG/ML
INJECTION INTRAVENOUS
Status: DISCONTINUED | OUTPATIENT
Start: 2025-09-03 | End: 2025-09-03

## 2025-09-03 RX ORDER — ONDANSETRON HYDROCHLORIDE 2 MG/ML
INJECTION, SOLUTION INTRAVENOUS
Status: DISCONTINUED | OUTPATIENT
Start: 2025-09-03 | End: 2025-09-03

## 2025-09-03 RX ORDER — PROPOFOL 10 MG/ML
VIAL (ML) INTRAVENOUS
Status: DISCONTINUED | OUTPATIENT
Start: 2025-09-03 | End: 2025-09-03

## 2025-09-03 RX ORDER — BUPIVACAINE HYDROCHLORIDE 7.5 MG/ML
INJECTION, SOLUTION EPIDURAL; RETROBULBAR
Status: COMPLETED | OUTPATIENT
Start: 2025-09-03 | End: 2025-09-03

## 2025-09-03 RX ORDER — HEPARIN SODIUM 1000 [USP'U]/ML
INJECTION, SOLUTION INTRAVENOUS; SUBCUTANEOUS
Status: DISCONTINUED | OUTPATIENT
Start: 2025-09-03 | End: 2025-09-03

## 2025-09-03 RX ORDER — KETAMINE HCL IN 0.9 % NACL 50 MG/5 ML
SYRINGE (ML) INTRAVENOUS
Status: DISCONTINUED | OUTPATIENT
Start: 2025-09-03 | End: 2025-09-03

## 2025-09-03 RX ORDER — LIDOCAINE HYDROCHLORIDE 20 MG/ML
INJECTION INTRAVENOUS
Status: DISCONTINUED | OUTPATIENT
Start: 2025-09-03 | End: 2025-09-03

## 2025-09-03 RX ORDER — DEXMEDETOMIDINE HYDROCHLORIDE 100 UG/ML
INJECTION, SOLUTION INTRAVENOUS
Status: DISCONTINUED | OUTPATIENT
Start: 2025-09-03 | End: 2025-09-03

## 2025-09-03 RX ADMIN — CEFAZOLIN 2 G: 330 INJECTION, POWDER, FOR SOLUTION INTRAMUSCULAR; INTRAVENOUS at 08:09

## 2025-09-03 RX ADMIN — PROPOFOL 140 MG: 10 INJECTION, EMULSION INTRAVENOUS at 07:09

## 2025-09-03 RX ADMIN — GLYCOPYRROLATE 0.4 MG: 0.2 INJECTION, SOLUTION INTRAMUSCULAR; INTRAVENOUS at 11:09

## 2025-09-03 RX ADMIN — ROCURONIUM BROMIDE 20 MG: 10 INJECTION, SOLUTION INTRAVENOUS at 08:09

## 2025-09-03 RX ADMIN — ROCURONIUM BROMIDE 30 MG: 10 INJECTION, SOLUTION INTRAVENOUS at 10:09

## 2025-09-03 RX ADMIN — DEXMEDETOMIDINE 8 MCG: 200 INJECTION, SOLUTION INTRAVENOUS at 11:09

## 2025-09-03 RX ADMIN — BUPIVACAINE HYDROCHLORIDE 15 ML: 7.5 INJECTION, SOLUTION EPIDURAL; RETROBULBAR at 07:09

## 2025-09-03 RX ADMIN — DEXAMETHASONE SODIUM PHOSPHATE 4 MG: 4 INJECTION, SOLUTION INTRAMUSCULAR; INTRAVENOUS at 07:09

## 2025-09-03 RX ADMIN — LIDOCAINE HYDROCHLORIDE 100 MG: 20 INJECTION INTRAVENOUS at 07:09

## 2025-09-03 RX ADMIN — Medication 10 MG: at 08:09

## 2025-09-03 RX ADMIN — PHENYLEPHRINE HYDROCHLORIDE 200 MCG: 10 INJECTION INTRAVENOUS at 10:09

## 2025-09-03 RX ADMIN — PHENYLEPHRINE HYDROCHLORIDE 0.2 MCG/KG/MIN: 10 INJECTION INTRAVENOUS at 10:09

## 2025-09-03 RX ADMIN — ROCURONIUM BROMIDE 50 MG: 10 INJECTION, SOLUTION INTRAVENOUS at 07:09

## 2025-09-03 RX ADMIN — ROCURONIUM BROMIDE 20 MG: 10 INJECTION, SOLUTION INTRAVENOUS at 09:09

## 2025-09-03 RX ADMIN — FENTANYL CITRATE 100 MCG: 50 INJECTION INTRAMUSCULAR; INTRAVENOUS at 07:09

## 2025-09-03 RX ADMIN — ROCURONIUM BROMIDE 10 MG: 10 INJECTION, SOLUTION INTRAVENOUS at 10:09

## 2025-09-03 RX ADMIN — Medication 10 MG: at 10:09

## 2025-09-03 RX ADMIN — Medication 10 MG: at 11:09

## 2025-09-03 RX ADMIN — SODIUM CHLORIDE, SODIUM GLUCONATE, SODIUM ACETATE, POTASSIUM CHLORIDE, MAGNESIUM CHLORIDE, SODIUM PHOSPHATE, DIBASIC, AND POTASSIUM PHOSPHATE: .53; .5; .37; .037; .03; .012; .00082 INJECTION, SOLUTION INTRAVENOUS at 10:09

## 2025-09-03 RX ADMIN — Medication 20 MG: at 07:09

## 2025-09-03 RX ADMIN — SODIUM CHLORIDE: 9 INJECTION, SOLUTION INTRAVENOUS at 07:09

## 2025-09-03 RX ADMIN — ONDANSETRON 4 MG: 2 INJECTION INTRAMUSCULAR; INTRAVENOUS at 11:09

## 2025-09-03 RX ADMIN — DEXMEDETOMIDINE 8 MCG: 200 INJECTION, SOLUTION INTRAVENOUS at 09:09

## 2025-09-03 RX ADMIN — SODIUM CHLORIDE: 9 INJECTION, SOLUTION INTRAVENOUS at 10:09

## 2025-09-03 RX ADMIN — HEPARIN SODIUM 5000 UNITS: 1000 INJECTION INTRAVENOUS; SUBCUTANEOUS at 08:09

## 2025-09-03 RX ADMIN — SODIUM CHLORIDE, SODIUM GLUCONATE, SODIUM ACETATE, POTASSIUM CHLORIDE, MAGNESIUM CHLORIDE, SODIUM PHOSPHATE, DIBASIC, AND POTASSIUM PHOSPHATE: .53; .5; .37; .037; .03; .012; .00082 INJECTION, SOLUTION INTRAVENOUS at 08:09
